# Patient Record
Sex: FEMALE | Race: WHITE | NOT HISPANIC OR LATINO | Employment: UNEMPLOYED | URBAN - METROPOLITAN AREA
[De-identification: names, ages, dates, MRNs, and addresses within clinical notes are randomized per-mention and may not be internally consistent; named-entity substitution may affect disease eponyms.]

---
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
## 2024-10-30 ENCOUNTER — NEW PATIENT COMPREHENSIVE (OUTPATIENT)
Dept: URBAN - METROPOLITAN AREA CLINIC 10 | Facility: CLINIC | Age: 24
End: 2024-10-30

## 2024-10-30 DIAGNOSIS — H04.123: ICD-10-CM

## 2024-10-30 DIAGNOSIS — H52.223: ICD-10-CM

## 2024-10-30 PROCEDURE — 92015 DETERMINE REFRACTIVE STATE: CPT

## 2024-10-30 PROCEDURE — 92004 COMPRE OPH EXAM NEW PT 1/>: CPT

## 2024-10-30 ASSESSMENT — KERATOMETRY
OS_K1POWER_DIOPTERS: 41.75
OD_K1POWER_DIOPTERS: 41.50
OS_AXISANGLE_DEGREES: 179
OD_K2POWER_DIOPTERS: 42.50
OD_AXISANGLE_DEGREES: 165
OS_K2POWER_DIOPTERS: 43.00
OD_AXISANGLE2_DEGREES: 75
OS_AXISANGLE2_DEGREES: 89

## 2024-10-30 ASSESSMENT — TONOMETRY
OS_IOP_MMHG: 14
OD_IOP_MMHG: 15

## 2024-10-30 ASSESSMENT — VISUAL ACUITY
OD_SC: 20/20-1
OU_SC: J1+
OS_SC: 20/20-1